# Patient Record
(demographics unavailable — no encounter records)

---

## 2024-10-31 NOTE — HISTORY OF PRESENT ILLNESS
[de-identified] : 78 y/o F presents for annual follow up for SNHL. Has b/l hearing aids but not wearing consistently.

## 2024-10-31 NOTE — HISTORY OF PRESENT ILLNESS
[de-identified] : 80 y/o F presents for annual follow up for SNHL. Has b/l hearing aids but not wearing consistently.

## 2025-04-01 NOTE — REVIEW OF SYSTEMS
[Fatigue] : fatigue [Palpitations] : palpitations [Constipation] : constipation [Dry Skin] : dry skin [Hair Loss] : hair loss [Insomnia] : insomnia [Stress] : stress [Cold Intolerance] : cold intolerance [Negative] : Respiratory [Recent Weight Gain (___ Lbs)] : no recent weight gain [Recent Weight Loss (___ Lbs)] : no recent weight loss [Polyuria] : no polyuria [Polydipsia] : no polydipsia [Swelling] : no swelling

## 2025-04-01 NOTE — PHYSICAL EXAM
[Alert] : alert [No Acute Distress] : no acute distress [Normal Sclera/Conjunctiva] : normal sclera/conjunctiva [EOMI] : extra ocular movement intact [No LAD] : no lymphadenopathy [Thyroid Not Enlarged] : the thyroid was not enlarged [No Thyroid Nodules] : no palpable thyroid nodules [No Respiratory Distress] : no respiratory distress [Clear to Auscultation] : lungs were clear to auscultation bilaterally [Normal S1, S2] : normal S1 and S2 [Regular Rhythm] : with a regular rhythm [No Edema] : no peripheral edema [Normal Bowel Sounds] : normal bowel sounds [Not Tender] : non-tender [Not Distended] : not distended [Soft] : abdomen soft [Normal Anterior Cervical Nodes] : no anterior cervical lymphadenopathy [No Spinal Tenderness] : no spinal tenderness [Kyphosis] : kyphosis present [Scoliosis] : scoliosis present [No Clubbing, Cyanosis] : no clubbing  or cyanosis of the fingernails [No Rash] : no rash [Normal Reflexes] : deep tendon reflexes were 2+ and symmetric [Normal Affect] : the affect was normal [Normal Mood] : the mood was normal [de-identified] : left palpable nodule noted and stable

## 2025-04-01 NOTE — HISTORY OF PRESENT ILLNESS
[FreeTextEntry1] : 79 y.o. female with h/o hypothyroidism s/p I131 for Graves' disease here for follow up visit.    She did meet new cardiologist, Dr. Miguel Stone at [place] recently.   She did follow up with cardiology at [facility] in October 2024. Had CT scan heart with CAC score of 1 with 4.8 cm mid ascending aorta. Now needs chest CTA.   Taking Synthroid 88 mcg daily which she is getting through [employer].   C/o fatigue in the afternoon. However, reports interrupted sleep. Weight is down since 2020 but stable since the last visit. C/o cold intolerance which is chronic. No neck complaints. Also c/o palpitations but less. Did see cardiology in October 2024 for work up. C/o hair loss but better since starting Biotin. Reports dry skin but seasonal. Reports posterior neck pain and saw specialist who recommended injection. She opted for PT. TMJ is stable now and eating more foods. Reports positive COLIN detected by her PCP and then did see rheumatology.   Thyroid/neck ultrasound in March 2022 showed atrophic thyroid gland without a discrete nodule and palpable nodule corresponds to the bifurcation of the left common carotid artery. No abnormal LNs are seen.   Also has prediabetes but likes sweets (chocolate and ice cream). Eating better. Exercising 3 times per week.   Breakfast: cereal with blueberries Lunch: cottage cheese with rice cakes Dinner: meals through the program (protein, starch and vegetables) Snacks: chocolate and candies  Also has hyperlipidemia, watching diet and exercising.    In regard to osteoporosis, taking vitamin D3 2,500 IU daily and calcium supplements. Was treated with Alendronate for a few years and then went on drug holiday. Has been off for several years. Also treated with Boniva and Actonel in the past. No fractures.   Last DEXA scan in August 2016 showed femoral neck -3.5, total hip -2.5 and 1/3 wrist -1.4. Spine not accurate with scoliosis. Has declined repeat imaging.   Does exercise now at the gym 3 times per week and doing weights. S/p dental implants many years ago. No current issues except for TMJ and UTD with dentist.

## 2025-04-01 NOTE — ASSESSMENT
[FreeTextEntry1] : 79 y.o. female with h/o hypothyroidism s/p I131, prediabetes, osteoporosis and hyperlipidemia.  1. Hypothyroidism- Will check TFTs and adjust Synthroid accordingly. Discussed risk of overtreatment including atrial fibrillation and bone loss.  2. Prediabetes- Encouraged a carbohydrate consistent diet and exercise. Will monitor for now. Stable CMP and Hba1c in October 2024 and will recheck today.   3. Osteoporosis- Encouraged weight bearing activity. Recommend medical therapy such as Denosumab given increased risk of fracture. Normal serum calcium and 25 vitamin D levels in 4/2024. Discussed appropriate calcium and vitamin D intake. Patient declines medical therapy. Patient also declines repeat DEXA scan.    4. Hyperlipidemia- Will check lipids. Encouraged a low-fat diet. Follows with cardiology.   If stable, follow up in 6 months.  Labs drawn in the office today.

## 2025-04-01 NOTE — DATA REVIEWED
[FreeTextEntry1] : Labs\par  12/22/15\par  TSH 0.066\par  Free T4 1.67\par  Hba1c 6%\par  calcium 9.9\par  25 vitamin D 35.9